# Patient Record
Sex: FEMALE | Race: BLACK OR AFRICAN AMERICAN
[De-identification: names, ages, dates, MRNs, and addresses within clinical notes are randomized per-mention and may not be internally consistent; named-entity substitution may affect disease eponyms.]

---

## 2020-07-29 ENCOUNTER — HOSPITAL ENCOUNTER (OUTPATIENT)
Dept: HOSPITAL 74 - JASUSAT | Age: 45
Discharge: HOME | End: 2020-07-29
Attending: OBSTETRICS & GYNECOLOGY
Payer: COMMERCIAL

## 2020-07-29 VITALS — TEMPERATURE: 98.1 F | SYSTOLIC BLOOD PRESSURE: 151 MMHG | HEART RATE: 82 BPM | DIASTOLIC BLOOD PRESSURE: 90 MMHG

## 2020-07-29 VITALS — BODY MASS INDEX: 38.4 KG/M2

## 2020-07-29 DIAGNOSIS — Z53.8: Primary | ICD-10-CM

## 2020-07-29 NOTE — PN
Progress Note (short form)





- Note


Progress Note: 





44 yo Para 2 with personal h/o hypertension, who was taken to operating room 

after Tap block, found to have elevated blood pressure (196/108).


Patient did not take her antihypertensive medication this am. Anesthesiologist 

did not think that it was safe to proceed with surgery.


Surgery was then cancelled. Case was discussed with patient. She understood. She

agrees to see her primary Dr. ASAP.








Problem List





- Problems


(1) Leiomyoma of body of uterus


Code(s): D25.9 - LEIOMYOMA OF UTERUS, UNSPECIFIED

## 2020-07-29 NOTE — HP
Admitting History and Physical





- Admission


Chief Complaint: Pelvic pain / Back pain / Prolonged menses


History of Present Illness: 





44 yo Para 2, with enlarged fibroid uterus is pre op for hysterectomy.


She takes HCTZ for hypertension. Uterus is c/w 16 weeks size.


History Source: Patient


Limitations to Obtaining History: No Limitations





- Past Medical History


...LMP: 07/01/20


...Pregnant: No


...Para: 2





- Past Surgical History


Additional Past Surgical History: 





Myomectomy





- Smoking History


Smoking history: Never smoked


Have you smoked in the past 12 months: No





- Alcohol/Substance Use


Hx Alcohol Use: No


History of Substance Use: reports: None





- Social History


History of Recent Travel: No





Home Medications





- Allergies


Allergies/Adverse Reactions: 


                                    Allergies











Allergy/AdvReac Type Severity Reaction Status Date / Time


 


Penicillins Allergy Severe Rash Verified 07/28/20 09:12


 


aspirin Allergy Intermediate Gastritis Verified 07/28/20 09:11














- Home Medications


Home Medications: 


Ambulatory Orders





Acetaminophen [Tylenol] 650 mg PO PRN 07/28/20 


Clindamycin [Cleocin -] 300 mg PO TID 07/28/20 


Ibuprofen 200 mg PO PRN 07/28/20 











Family Medical History


Family History: Unremarkable





Review of Systems





- Review of Systems


Constitutional: reports: No Symptoms


Neck: reports: No Symptoms


Cardiovascular: denies: Chest Pain, Palpitations


Respiratory: denies: SOB


Gastrointestinal: reports: No Symptoms


Genitourinary: reports: Pain


Breasts: reports: No Symptoms Reported


Musculoskeletal: reports: No Symptoms


Neurological: reports: No Symptoms


Psychiatric: reports: No Symptoms


Pain Intensity: 4





Physical Examination


Constitutional: No: No Distress


HENT: Yes: Atraumatic


Neck: Yes: Supple


Cardiovascular: Yes: Regular Rate and Rhythm


Respiratory: Yes: Regular


Gastrointestinal: Yes: Normal Bowel Sounds, Palpable Mass


Musculoskeletal: Yes: WNL


Extremities: Yes: WNL


Neurological: Yes: Alert, Oriented


...Motor Strength: WNL


Psychiatric: Yes: Alert, Oriented





Problem List





- Problems


(1) Leiomyoma of body of uterus


Problems reviewed: Yes   


Code(s): D25.9 - LEIOMYOMA OF UTERUS, UNSPECIFIED   





Assessment/Plan





Leiomyoma of the uterus


Pre op for Hysterectomy


Consent signed


Anesthesia to see patient

## 2020-09-02 ENCOUNTER — HOSPITAL ENCOUNTER (EMERGENCY)
Dept: HOSPITAL 74 - JER | Age: 45
Discharge: HOME | End: 2020-09-02
Payer: COMMERCIAL

## 2020-09-02 VITALS — DIASTOLIC BLOOD PRESSURE: 85 MMHG | SYSTOLIC BLOOD PRESSURE: 139 MMHG | TEMPERATURE: 98.2 F | HEART RATE: 87 BPM

## 2020-09-02 VITALS — BODY MASS INDEX: 38.4 KG/M2

## 2020-09-02 DIAGNOSIS — R10.30: Primary | ICD-10-CM

## 2020-09-02 DIAGNOSIS — D25.9: ICD-10-CM

## 2020-09-02 LAB
ALBUMIN SERPL-MCNC: 3.7 G/DL (ref 3.4–5)
ALP SERPL-CCNC: 72 U/L (ref 45–117)
ALT SERPL-CCNC: 20 U/L (ref 13–61)
ANION GAP SERPL CALC-SCNC: 5 MMOL/L (ref 8–16)
ANISOCYTOSIS BLD QL: (no result)
APPEARANCE UR: CLEAR
AST SERPL-CCNC: 16 U/L (ref 15–37)
BASOPHILS # BLD: 1.2 % (ref 0–2)
BILIRUB SERPL-MCNC: 0.5 MG/DL (ref 0.2–1)
BILIRUB UR STRIP.AUTO-MCNC: NEGATIVE MG/DL
BUN SERPL-MCNC: 7.6 MG/DL (ref 7–18)
CALCIUM SERPL-MCNC: 9.2 MG/DL (ref 8.5–10.1)
CHLORIDE SERPL-SCNC: 104 MMOL/L (ref 98–107)
CO2 SERPL-SCNC: 28 MMOL/L (ref 21–32)
COLOR UR: YELLOW
CREAT SERPL-MCNC: 0.8 MG/DL (ref 0.55–1.3)
DEPRECATED RDW RBC AUTO: 18.9 % (ref 11.6–15.6)
EOSINOPHIL # BLD: 3.8 % (ref 0–4.5)
GLUCOSE SERPL-MCNC: 91 MG/DL (ref 74–106)
HCT VFR BLD CALC: 28.3 % (ref 32.4–45.2)
HGB BLD-MCNC: 8.2 GM/DL (ref 10.7–15.3)
KETONES UR QL STRIP: NEGATIVE
LEUKOCYTE ESTERASE UR QL STRIP.AUTO: NEGATIVE
LYMPHOCYTES # BLD: 27.4 % (ref 8–40)
MCH RBC QN AUTO: 19.9 PG (ref 25.7–33.7)
MCHC RBC AUTO-ENTMCNC: 29.1 G/DL (ref 32–36)
MCV RBC: 68.2 FL (ref 80–96)
MONOCYTES # BLD AUTO: 13.5 % (ref 3.8–10.2)
NEUTROPHILS # BLD: 54.1 % (ref 42.8–82.8)
NITRITE UR QL STRIP: NEGATIVE
OVALOCYTES BLD QL SMEAR: (no result)
PH UR: 7.5 [PH] (ref 5–8)
PLATELET # BLD AUTO: 404 K/MM3 (ref 134–434)
PLATELET BLD QL SMEAR: ADEQUATE
PMV BLD: 10.3 FL (ref 7.5–11.1)
POTASSIUM SERPLBLD-SCNC: 4.4 MMOL/L (ref 3.5–5.1)
PROT SERPL-MCNC: 8.2 G/DL (ref 6.4–8.2)
PROT UR QL STRIP: NEGATIVE
PROT UR QL STRIP: NEGATIVE
RBC # BLD AUTO: 4.14 M/MM3 (ref 3.6–5.2)
SODIUM SERPL-SCNC: 138 MMOL/L (ref 136–145)
SP GR UR: 1 (ref 1.01–1.03)
TARGETS BLD QL SMEAR: (no result)
UROBILINOGEN UR STRIP-MCNC: 0.2 MG/DL (ref 0.2–1)
WBC # BLD AUTO: 7.2 K/MM3 (ref 4–10)

## 2020-09-02 PROCEDURE — 3E0333Z INTRODUCTION OF ANTI-INFLAMMATORY INTO PERIPHERAL VEIN, PERCUTANEOUS APPROACH: ICD-10-PCS

## 2020-09-02 NOTE — PDOC
History of Present Illness





- General


Chief Complaint: Pain


Stated Complaint: PAIN


Time Seen by Provider: 09/02/20 16:56


History Source: Patient


Exam Limitations: No Limitations





- History of Present Illness


Initial Comments: 





09/02/20 18:11


Patient is a 45-year-old female with a history of hypertension and fibroids who 

presents to the ED with complaint of lower abdominal pain that is radiating to 

her back since yesterday.  She states that the pain is squeezing-like in nature.

 She states the pain is very similar to the pain she gets with her fibroids.  

She was supposed to have a hysterectomy July 29, 2020 but her blood pressure was

out of control and the surgery got canceled.  She states her LMP was on August 23 and she is no longer bleeding.  She tried taking Tylenol earlier and she 

states it helped her pain slightly but did not resolve it completely.  She 

denies any vaginal bleeding.  She denies any vaginal discharge or complaints.  

She denies any dysuria, hematuria, urgency or frequency.  The patient states 

that she came to the ED because she was unable to tolerate the pain today.





Past History





- Medical History


Allergies/Adverse Reactions: 


                                    Allergies











Allergy/AdvReac Type Severity Reaction Status Date / Time


 


Penicillins Allergy Severe Rash Verified 09/02/20 17:39


 


aspirin Allergy Intermediate Gastritis Verified 09/02/20 17:39











Home Medications: 


Ambulatory Orders





Amlodipine Besylate 5 mg PO DAILY 09/02/20 








COPD: No


HTN: Yes (sometimes takes medication)


Other medical history: history of fibroids





- Reproductive History


Is Patient Pregnant Now?: No





- Immunization History


Immunization Up to Date: Yes





- Psycho-Social/Smoking History


Smoking History: Never smoked


Have you smoked in the past 12 months: No


Information on smoking cessation initiated: No





- Substance Abuse Hx (Audit-C & DAST Scrn)


How often the patient has a drink containing alcohol: Never


Score: In Men: 4 or > Positive; In Women: 3 or > Positive: 0


Screen Result (Pos requires Nsg. Audit-10AR): Negative


In the last yr the pt used illegal drug/Rx for NonMed reason: No


Score:  Yes response is considered Positive: 0


Screen Result (Positive result requires Nsg. DAST-10): Negative





**Review of Systems





- Review of Systems


Comments:: 





09/02/20 18:12


- Review of Systems


Able to Perform ROS?: Yes


Constitutional: No: Fever, Chills, Loss of Appetite, Night Sweats, Weakness


HEENTM: No: Eye Pain, Vision changes, Ear Pain, Throat Pain, Throat Swelling, 

Mouth Pain, Difficulty Swallowing


Respiratory: No: Cough, Shortness of Breath, Wheezing, Sputum Production


Cardiac (ROS): No: Chest Pain, Chest Tightness, Palpitations, Irregular Heart 

Beat, Edema


ABD/GI: No: Nausea, Vomiting, Abdominal Pain, Diarrhea; positive: Lower 

abdominal pain that radiates to her back


: No Dysuria, No Hematuria, No Frequency, No Urgency, No Vaginal 

Discharge/Pain


Musculoskeletal: No: Muscle Pain, Back Pain, Joint Pain, Muscle Weakness, Neck 

Pain


Integumentary: No: Lesions, Rash


Neurological: No: Headache, Numbness, Tingling, Weakness, Speech Difficulties





*Physical Exam





- Vital Signs


                                Last Vital Signs











Temp Pulse Resp BP Pulse Ox


 


 98.2 F   87   17   139/85   99 


 


 09/02/20 16:47  09/02/20 16:47  09/02/20 16:47  09/02/20 16:47  09/02/20 16:47














- Physical Exam





09/02/20 18:13


- Physical Exam


General Appearance:  Nourished, Appropriately Dressed, No Distress


HEENT: EOMI, Normal Voice, Hearing Grossly Normal


Neck: Supple, No Lymphadenopathy (R), No Lymphadenopathy (L), No Rigidity, No 

Decreased range of motion


Respiratory/Chest: Lungs Clear, Normal Breath Sounds.  No Respiratory Distress, 

No Accessory Muscle Use


Cardiovascular: Regular Rhythm, Regular Rate, S1, S2


Gastrointestinal/Abdominal: Normal Bowel Sounds, Soft.  Non-tender, No Guarding,

No Rebound, No Rigidity; palpable firm masses in the abdomen consistent with the

patient's fibroids.  Moderate tenderness to palpation to the lower abdomen.  No 

CVA tenderness bilaterally.  Abdomen otherwise soft.


Musculoskeletal: Normal Inspection.  No Decreased Range of Motion


Extremity: Normal Capillary Refill, Normal Inspection


Integumentary:  Normal Color, Dry.  No Rash


Neurologic: CNs II-XII NML intact, Fully Oriented, Alert, Normal Mood/Affect, No

rmal Response





ED Treatment Course





- LABORATORY


CBC & Chemistry Diagram: 


                                 09/02/20 18:00





                                 09/02/20 18:00





Medical Decision Making





- Medical Decision Making





09/02/20 18:13


Assessment: Patient is a 45-year-old female with lower abdominal pain radiating 

to her back since yesterday.





Plan:


-Labs ordered and sent


-Saline lock placed


-Toradol 80 mg IV ordered


-Ultrasound ordered


-Will reassess








09/02/20 20:20


The patient has been made aware that her labs are stable with a hemoglobin of 

8.2.  In July 2020 her hemoglobin was 8.4.  She has been made aware that her 

pain is likely secondary to her fibroids.  She states her pain is resolved after

getting the Toradol.  She will call her surgeon tomorrow to reschedule her 

hysterectomy secondary to her fibroids.  The patient states that she feels 

comfortable with this treatment plan and she is stable for discharge.





Discharge





- Discharge Information


Problems reviewed: Yes


Clinical Impression/Diagnosis: 


 Suprapubic pain, Lower abdominal pain





Uterine fibroid


Qualifiers:


 Uterine leiomyoma location: unspecified location Qualified Code(s): D25.9 - 

Leiomyoma of uterus, unspecified





Condition: Stable


Disposition: HOME





- Follow up/Referral


Referrals: 


Chace Beck [Primary Care Provider] - Call tomorrow





- Patient Discharge Instructions


Patient Printed Discharge Instructions:  [facility] for Uterine Fibroids


Additional Instructions: 


Get plenty of rest and drink plenty of fluids.  Be sure to follow-up with your [facility]

[facility] surgeon to reschedule your hysterectomy secondary to your fibroids.  Your 

lab work appears relatively unchanged since July 2020.  You can take Tylenol or 

ibuprofen for pain.  Avoid any strenuous activity.





- Post Discharge Activity


Work/Back to School Note:  Back to Work

## 2020-09-02 NOTE — PDOC
Rapid Medical Evaluation


Chief Complaint: Pain


Time Seen by Provider: 09/02/20 16:56


Medical Evaluation: 


                                    Allergies











Allergy/AdvReac Type Severity Reaction Status Date / Time


 


Penicillins Allergy Severe Rash Verified 07/28/20 09:12


 


aspirin Allergy Intermediate Gastritis Verified 07/28/20 09:11








Vital Signs











Temp Pulse Resp BP Pulse Ox


 


 98.2 F   87   17   139/85   99 


 


 09/02/20 16:47  09/02/20 16:47  09/02/20 16:47  09/02/20 16:47  09/02/20 16:47








09/02/20 17:00


CC: rt suprapubic cramping radiating to back, hx fibroids with sim discomfort


Exam: mid-rt suprapubic tenderness, no cva tenderness


Plan: urine, labs, u/s





**Discharge Disposition





- Diagnosis


 Suprapubic pain








- Referrals





- Patient Instructions





- Post Discharge Activity